# Patient Record
Sex: FEMALE | Race: WHITE | NOT HISPANIC OR LATINO | Employment: PART TIME | URBAN - METROPOLITAN AREA
[De-identification: names, ages, dates, MRNs, and addresses within clinical notes are randomized per-mention and may not be internally consistent; named-entity substitution may affect disease eponyms.]

---

## 2017-11-15 ENCOUNTER — CONVERSION ENCOUNTER (OUTPATIENT)
Dept: MAMMOGRAPHY | Facility: HOSPITAL | Age: 52
End: 2017-11-15

## 2018-08-28 ENCOUNTER — OFFICE VISIT CONVERTED (OUTPATIENT)
Dept: CARDIOLOGY | Facility: CLINIC | Age: 53
End: 2018-08-28
Attending: SPECIALIST

## 2018-10-26 ENCOUNTER — CONVERSION ENCOUNTER (OUTPATIENT)
Dept: CARDIOLOGY | Facility: CLINIC | Age: 53
End: 2018-10-26
Attending: SPECIALIST

## 2021-05-16 VITALS
DIASTOLIC BLOOD PRESSURE: 82 MMHG | WEIGHT: 145 LBS | BODY MASS INDEX: 24.16 KG/M2 | SYSTOLIC BLOOD PRESSURE: 126 MMHG | HEIGHT: 65 IN | HEART RATE: 68 BPM

## 2022-06-26 ENCOUNTER — APPOINTMENT (OUTPATIENT)
Dept: CT IMAGING | Facility: HOSPITAL | Age: 57
End: 2022-06-26

## 2022-06-26 ENCOUNTER — HOSPITAL ENCOUNTER (EMERGENCY)
Facility: HOSPITAL | Age: 57
Discharge: HOME | End: 2022-06-26
Attending: EMERGENCY MEDICINE | Admitting: EMERGENCY MEDICINE

## 2022-06-26 ENCOUNTER — APPOINTMENT (OUTPATIENT)
Dept: GENERAL RADIOLOGY | Facility: HOSPITAL | Age: 57
End: 2022-06-26

## 2022-06-26 VITALS
RESPIRATION RATE: 18 BRPM | OXYGEN SATURATION: 93 % | DIASTOLIC BLOOD PRESSURE: 72 MMHG | TEMPERATURE: 98.2 F | HEART RATE: 69 BPM | SYSTOLIC BLOOD PRESSURE: 115 MMHG | HEIGHT: 65 IN | WEIGHT: 150 LBS | BODY MASS INDEX: 24.99 KG/M2

## 2022-06-26 DIAGNOSIS — S51.802A AVULSION OF SKIN OF LEFT FOREARM, INITIAL ENCOUNTER: ICD-10-CM
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
## 2022-06-26 DIAGNOSIS — S52.531A CLOSED COLLES' FRACTURE OF RIGHT RADIUS, INITIAL ENCOUNTER: ICD-10-CM

## 2022-06-26 PROCEDURE — 25010000002 KETOROLAC TROMETHAMINE PER 15 MG: Performed by: EMERGENCY MEDICINE

## 2022-06-26 PROCEDURE — 73110 X-RAY EXAM OF WRIST: CPT

## 2022-06-26 PROCEDURE — 99284 EMERGENCY DEPT VISIT MOD MDM: CPT

## 2022-06-26 PROCEDURE — 96372 THER/PROPH/DIAG INJ SC/IM: CPT

## 2022-06-26 PROCEDURE — 70450 CT HEAD/BRAIN W/O DYE: CPT

## 2022-06-26 RX ORDER — HYDROCODONE BITARTRATE AND ACETAMINOPHEN 7.5; 325 MG/1; MG/1
1 TABLET ORAL EVERY 4 HOURS PRN
Qty: 16 TABLET | Refills: 0 | Status: SHIPPED | OUTPATIENT
Start: 2022-06-26 | End: 2022-06-30 | Stop reason: HOSPADM

## 2022-06-26 RX ORDER — NAPROXEN 500 MG/1
500 TABLET ORAL 2 TIMES DAILY WITH MEALS
Qty: 14 TABLET | Refills: 0 | Status: SHIPPED | OUTPATIENT
Start: 2022-06-26 | End: 2022-09-12

## 2022-06-26 RX ORDER — KETOROLAC TROMETHAMINE 30 MG/ML
30 INJECTION, SOLUTION INTRAMUSCULAR; INTRAVENOUS ONCE
Status: COMPLETED | OUTPATIENT
Start: 2022-06-26 | End: 2022-06-26

## 2022-06-26 RX ORDER — HYDROCODONE BITARTRATE AND ACETAMINOPHEN 7.5; 325 MG/1; MG/1
1 TABLET ORAL ONCE
Status: COMPLETED | OUTPATIENT
Start: 2022-06-26 | End: 2022-06-26

## 2022-06-26 RX ADMIN — HYDROCODONE BITARTRATE AND ACETAMINOPHEN 1 TABLET: 7.5; 325 TABLET ORAL at 01:55

## 2022-06-26 RX ADMIN — KETOROLAC TROMETHAMINE 30 MG: 30 INJECTION, SOLUTION INTRAMUSCULAR; INTRAVENOUS at 04:25

## 2022-06-27 ENCOUNTER — PREP FOR SURGERY (OUTPATIENT)
Dept: OTHER | Facility: HOSPITAL | Age: 57
End: 2022-06-27

## 2022-06-27 ENCOUNTER — OFFICE VISIT (OUTPATIENT)
Dept: ORTHOPEDIC SURGERY | Facility: CLINIC | Age: 57
End: 2022-06-27

## 2022-06-27 ENCOUNTER — TELEPHONE (OUTPATIENT)
Dept: ORTHOPEDIC SURGERY | Facility: CLINIC | Age: 57
End: 2022-06-27

## 2022-06-27 VITALS — HEART RATE: 81 BPM | OXYGEN SATURATION: 96 % | HEIGHT: 65 IN | WEIGHT: 153 LBS | BODY MASS INDEX: 25.49 KG/M2

## 2022-06-27 DIAGNOSIS — S52.501A CLOSED FRACTURE OF DISTAL END OF RIGHT RADIUS, UNSPECIFIED FRACTURE MORPHOLOGY, INITIAL ENCOUNTER: Primary | ICD-10-CM

## 2022-06-27 PROCEDURE — 99203 OFFICE O/P NEW LOW 30 MIN: CPT | Performed by: STUDENT IN AN ORGANIZED HEALTH CARE EDUCATION/TRAINING PROGRAM

## 2022-06-27 RX ORDER — CEFAZOLIN SODIUM 2 G/100ML
2 INJECTION, SOLUTION INTRAVENOUS ONCE
Status: CANCELLED | OUTPATIENT
Start: 2022-06-27 | End: 2022-06-27

## 2022-06-27 RX ORDER — CEFAZOLIN SODIUM IN 0.9 % NACL 3 G/100 ML
3 INTRAVENOUS SOLUTION, PIGGYBACK (ML) INTRAVENOUS ONCE
Status: CANCELLED | OUTPATIENT
Start: 2022-06-27 | End: 2022-06-27

## 2022-06-27 NOTE — PROGRESS NOTES
"Chief Complaint  Pain and Initial Evaluation of the Right Wrist    Subjective          Ailin Dexter presents to [facility] for   History of Present Illness    Patient presents today for evaluation of right wrist fracture. Patient reports she fell Saturday night when she slipped and fell over her dogs toys. She landed on right outstretched extremity. She was seen with xrays revealing a displaced distal radius fracture. She was placed into a cast. She is right hand dominant.  No Known Allergies     Social History     Socioeconomic History   • Marital status:    Tobacco Use   • Smoking status: Current Every Day Smoker   • Smokeless tobacco: Never Used        I reviewed the patient's chief complaint, history of present illness, review of systems, past medical history, surgical history, family history, social history, medications, and allergy list.     REVIEW OF SYSTEMS    Constitutional: Denies fevers, chills, weight loss  Cardiovascular: Denies chest pain, shortness of breath  Skin: Denies rashes, acute skin changes  Neurologic: Denies headache, loss of consciousness  MSK: right wrist      Objective   Vital Signs:   Pulse 81   Ht 165.1 cm (65\")   Wt 69.4 kg (153 lb)   SpO2 96%   BMI 25.46 kg/m²     Body mass index is 25.46 kg/m².    Physical Exam    General: Alert. No acute distress.    able to move the fingers,sensation intact, good capillary refill, sugar-tong splint, clean and dry, neuro intact, minimal swelling    Procedures    Imaging Results (Most Recent)     None                   Assessment and Plan        XR Wrist 3+ View Right    Result Date: 6/26/2022  Narrative: PROCEDURE: XR WRIST 3+ VW RIGHT  COMPARISON: None.  INDICATIONS: FALL. RIGHT WRIST PAIN.  FINDINGS:  3 views of the right wrist were obtained.  There is an acute Colles' fracture of the right radius.  A comminuted impacted possibly intra-articular closed fracture of the distal right radius is noted with " dorsal angulation of the distal fracture fragment, which is roughly estimated at 28 degrees.  There is approximately 8 mm of dorsal displacement of the large distal right radial fracture fragment.  The radiocarpal articulation and the distal radial-ulnar junction are intact.  There are mild degenerative changes.  External artifact obscures detail.  No definite acute fracture of the right ulnar styloid process is appreciated on the views provided.  No other acute fractures are seen.  Acute soft tissue contusion is centered about the right wrist.      Impression:   An acute fracture of the distal right radius is present, as discussed.  No dislocation.      COMMENT:  Part of this note is an electronic transcription of spoken language to printed text. The electronic translation/transcription may permit erroneous, or at times, nonsensical (or even sensical) words or phrases to be inadvertently transcribed or omitted; this  has reviewed the note for such errors (as well as additional errors); however, some may still exist.  CASSANDRA PORTILLO JR, MD       Electronically Signed and Approved By: CASSANDRA PORTILLO JR, MD on 6/26/2022 at 3:21              CT Head Without Contrast    Result Date: 6/26/2022  Narrative: PROCEDURE: CT HEAD WO CONTRAST  COMPARISON: None.  INDICATIONS: HEADACHE AFTER FALL, TODAY.  PROTOCOL:   Standard imaging protocol performed    RADIATION:   Total DLP: 1,081.2 mGy*cm   MA and/or KV were/was adjusted to minimize radiation dose.    TECHNIQUE: After obtaining the patient's consent, 138 CT images were obtained without non-ionic intravenous contrast material.  DISCUSSION:   A routine nonenhanced head CT was performed.  No acute brain abnormality is identified.  No acute intracranial hemorrhage.  No acute infarct is seen.  No acute skull fracture.  No midline shift or acute intracranial mass effect is seen.  The ventricular size and configuration are within normal limits.  Degenerative changes  involve the temporomandibular joints, greater on the right.  There are few opacities and inferior left and probably right mastoid air cells.  These findings are age-indeterminate.  They may represent chronic congestive and/or inflammatory change.  No acute fractures are seen in these regions.  No air-fluid interfaces are seen within the imaged paranasal sinuses.  There may be minimal mucosal thickening present.  There is very slight chronic rightward nasal septal deviation.  Grossly, no acute orbital findings are identified by routine nonenhanced head CT examination.      Impression:  No acute brain abnormality is seen. Specifically, no acute intracranial hemorrhage, no acute infarct, no intracranial mass lesion, and no acute intracranial mass effect are appreciated.   COMMENT:  Part of this note is an electronic transcription of spoken language to printed text. The electronic translation/transcription may permit erroneous, or at times, nonsensical (or even sensical) words or phrases to be inadvertently transcribed or omitted; this  has reviewed the note for such errors (as well as additional errors); however, some may still exist.  CASSANDRA PORTILLO JR, MD       Electronically Signed and Approved By: CASSANDRA PORTILLO JR, MD on 6/26/2022 at 3:50                 Diagnoses and all orders for this visit:    1. Closed fracture of distal end of right radius, unspecified fracture morphology, initial encounter (Primary)      We discussed surgical management.  We specifically discussed open reduction internal fixation of the right distal radius fracture.  We discussed risk, benefits, indications, and alternatives to surgical management.  She elected to proceed.  Consent was obtained.  Maintain splint and nonweightbearing status prior to surgery.  Ice and elevate to help with swelling.      Discussed surgery. and Risks/benefits discussed with patient including, but not limited to: infection, bleeding, neurovascular  damage, malunion, nonunion, aesthetic deformity, need for further surgery, and death.    Scribed for Erasto Schroeder MD by Linette Hendricks  06/27/2022   14:29 EDT         Follow Up   Return in about 2 weeks (around 7/11/2022).  Patient was given instructions and counseling regarding her condition or for health maintenance advice. Please see specific information pulled into the AVS if appropriate.   We discussed surgical risks including bleeding, infection, damage to nerves, blood vessels, persistent pain, stiffness, anesthesia risk, and DVT/PE. Patient understood and elected to proceed with surgical management.   We recommended she benefit from surgical intervention of this wrist fracture. She expressed understanding of the risks and benefits. She wished to proceed with surgical intervention. Advised to elevate, ice and move the fingers while she waits on surgery.      I have personally performed the services described in this document as scribed by the above individual and it is both accurate and complete.     Erasto Schroeder MD  06/27/22  14:33 EDT

## 2022-06-27 NOTE — TELEPHONE ENCOUNTER
Closed Colles' fracture of right radius, initial encounter and Avulsion of skin of left forearm, initial encounter- NOTES: 06.26.22- IMAGING; XRAY OF RIGHT WRIST 06.26.22 AT [facility].

## 2022-06-28 RX ORDER — PERPHENAZINE/AMITRIPTYLINE HCL 4MG-10MG
1 TABLET ORAL DAILY
COMMUNITY

## 2022-06-28 RX ORDER — MULTIVIT WITH MINERALS/LUTEIN
250 TABLET ORAL DAILY
COMMUNITY

## 2022-06-28 RX ORDER — BIOTIN 2500 MCG
CAPSULE ORAL
COMMUNITY

## 2022-06-28 RX ORDER — ECHINACEA 400 MG
1 CAPSULE ORAL DAILY
COMMUNITY

## 2022-06-30 ENCOUNTER — ANESTHESIA EVENT (OUTPATIENT)
Dept: PERIOP | Facility: HOSPITAL | Age: 57
End: 2022-06-30

## 2022-06-30 ENCOUNTER — APPOINTMENT (OUTPATIENT)
Dept: GENERAL RADIOLOGY | Facility: HOSPITAL | Age: 57
End: 2022-06-30

## 2022-06-30 ENCOUNTER — HOSPITAL ENCOUNTER (OUTPATIENT)
Facility: HOSPITAL | Age: 57
Setting detail: HOSPITAL OUTPATIENT SURGERY
Discharge: HOME | End: 2022-06-30
Attending: STUDENT IN AN ORGANIZED HEALTH CARE EDUCATION/TRAINING PROGRAM | Admitting: STUDENT IN AN ORGANIZED HEALTH CARE EDUCATION/TRAINING PROGRAM

## 2022-06-30 ENCOUNTER — ANESTHESIA (OUTPATIENT)
Dept: PERIOP | Facility: HOSPITAL | Age: 57
End: 2022-06-30

## 2022-06-30 VITALS
WEIGHT: 153.88 LBS | RESPIRATION RATE: 16 BRPM | HEIGHT: 65 IN | TEMPERATURE: 97.7 F | BODY MASS INDEX: 25.64 KG/M2 | OXYGEN SATURATION: 93 % | DIASTOLIC BLOOD PRESSURE: 64 MMHG | HEART RATE: 76 BPM | SYSTOLIC BLOOD PRESSURE: 116 MMHG

## 2022-06-30 DIAGNOSIS — S52.501A CLOSED FRACTURE OF DISTAL END OF RIGHT RADIUS, UNSPECIFIED FRACTURE MORPHOLOGY, INITIAL ENCOUNTER: Primary | ICD-10-CM

## 2022-06-30 LAB — QT INTERVAL: 415 MS

## 2022-06-30 PROCEDURE — 25010000002 PROPOFOL 10 MG/ML EMULSION: Performed by: NURSE ANESTHETIST, CERTIFIED REGISTERED

## 2022-06-30 PROCEDURE — C1713 ANCHOR/SCREW BN/BN,TIS/BN: HCPCS | Performed by: STUDENT IN AN ORGANIZED HEALTH CARE EDUCATION/TRAINING PROGRAM

## 2022-06-30 PROCEDURE — 25010000002 MIDAZOLAM PER 1 MG: Performed by: ANESTHESIOLOGY

## 2022-06-30 PROCEDURE — 25010000002 CEFAZOLIN IN DEXTROSE 2-4 GM/100ML-% SOLUTION: Performed by: STUDENT IN AN ORGANIZED HEALTH CARE EDUCATION/TRAINING PROGRAM

## 2022-06-30 PROCEDURE — 25010000002 ONDANSETRON PER 1 MG: Performed by: NURSE ANESTHETIST, CERTIFIED REGISTERED

## 2022-06-30 PROCEDURE — 25010000002 DEXAMETHASONE PER 1 MG: Performed by: NURSE ANESTHETIST, CERTIFIED REGISTERED

## 2022-06-30 PROCEDURE — 93005 ELECTROCARDIOGRAM TRACING: CPT | Performed by: STUDENT IN AN ORGANIZED HEALTH CARE EDUCATION/TRAINING PROGRAM

## 2022-06-30 PROCEDURE — 25609 OPTX DST RD XART FX/EP SEP3+: CPT | Performed by: STUDENT IN AN ORGANIZED HEALTH CARE EDUCATION/TRAINING PROGRAM

## 2022-06-30 PROCEDURE — 76942 ECHO GUIDE FOR BIOPSY: CPT | Performed by: STUDENT IN AN ORGANIZED HEALTH CARE EDUCATION/TRAINING PROGRAM

## 2022-06-30 PROCEDURE — 73100 X-RAY EXAM OF WRIST: CPT

## 2022-06-30 PROCEDURE — 25609 OPTX DST RD XART FX/EP SEP3+: CPT | Performed by: PHYSICIAN ASSISTANT

## 2022-06-30 PROCEDURE — 76000 FLUOROSCOPY <1 HR PHYS/QHP: CPT

## 2022-06-30 PROCEDURE — 25010000002 FENTANYL CITRATE (PF) 50 MCG/ML SOLUTION: Performed by: NURSE ANESTHETIST, CERTIFIED REGISTERED

## 2022-06-30 DEVICE — LOCKING SCREW, FULLY THREADED,T8
Type: IMPLANTABLE DEVICE | Site: WRIST | Status: FUNCTIONAL
Brand: VARIAX

## 2022-06-30 DEVICE — BONE SCREW, FULLY THREADED, T8
Type: IMPLANTABLE DEVICE | Site: WRIST | Status: FUNCTIONAL
Brand: VARIAX

## 2022-06-30 DEVICE — VOLAR DR PLATE INTERM. RIGHT SHORT
Type: IMPLANTABLE DEVICE | Site: WRIST | Status: FUNCTIONAL
Brand: VARIAX

## 2022-06-30 RX ORDER — OXYCODONE HYDROCHLORIDE 5 MG/1
5 TABLET ORAL
Status: DISCONTINUED | OUTPATIENT
Start: 2022-06-30 | End: 2022-06-30 | Stop reason: HOSPADM

## 2022-06-30 RX ORDER — BUPIVACAINE HYDROCHLORIDE AND EPINEPHRINE 5; 5 MG/ML; UG/ML
INJECTION, SOLUTION EPIDURAL; INTRACAUDAL; PERINEURAL
Status: COMPLETED | OUTPATIENT
Start: 2022-06-30 | End: 2022-06-30

## 2022-06-30 RX ORDER — LIDOCAINE HYDROCHLORIDE 20 MG/ML
INJECTION, SOLUTION EPIDURAL; INFILTRATION; INTRACAUDAL; PERINEURAL AS NEEDED
Status: DISCONTINUED | OUTPATIENT
Start: 2022-06-30 | End: 2022-06-30 | Stop reason: SURG

## 2022-06-30 RX ORDER — MAGNESIUM HYDROXIDE 1200 MG/15ML
LIQUID ORAL AS NEEDED
Status: DISCONTINUED | OUTPATIENT
Start: 2022-06-30 | End: 2022-06-30 | Stop reason: HOSPADM

## 2022-06-30 RX ORDER — DOCUSATE SODIUM 100 MG/1
100 CAPSULE, LIQUID FILLED ORAL 2 TIMES DAILY PRN
Qty: 20 CAPSULE | Refills: 0 | Status: SHIPPED | OUTPATIENT
Start: 2022-06-30 | End: 2022-09-12

## 2022-06-30 RX ORDER — CEFAZOLIN SODIUM 2 G/100ML
2 INJECTION, SOLUTION INTRAVENOUS ONCE
Status: COMPLETED | OUTPATIENT
Start: 2022-06-30 | End: 2022-06-30

## 2022-06-30 RX ORDER — MIDAZOLAM HYDROCHLORIDE 1 MG/ML
2 INJECTION INTRAMUSCULAR; INTRAVENOUS ONCE
Status: COMPLETED | OUTPATIENT
Start: 2022-06-30 | End: 2022-06-30

## 2022-06-30 RX ORDER — PROMETHAZINE HYDROCHLORIDE 12.5 MG/1
25 TABLET ORAL ONCE AS NEEDED
Status: DISCONTINUED | OUTPATIENT
Start: 2022-06-30 | End: 2022-06-30 | Stop reason: HOSPADM

## 2022-06-30 RX ORDER — ACETAMINOPHEN 500 MG
1000 TABLET ORAL ONCE
Status: COMPLETED | OUTPATIENT
Start: 2022-06-30 | End: 2022-06-30

## 2022-06-30 RX ORDER — ONDANSETRON 2 MG/ML
4 INJECTION INTRAMUSCULAR; INTRAVENOUS ONCE AS NEEDED
Status: DISCONTINUED | OUTPATIENT
Start: 2022-06-30 | End: 2022-06-30 | Stop reason: HOSPADM

## 2022-06-30 RX ORDER — EPHEDRINE SULFATE 50 MG/ML
INJECTION, SOLUTION INTRAVENOUS AS NEEDED
Status: DISCONTINUED | OUTPATIENT
Start: 2022-06-30 | End: 2022-06-30 | Stop reason: SURG

## 2022-06-30 RX ORDER — MIDAZOLAM HYDROCHLORIDE 1 MG/ML
INJECTION INTRAMUSCULAR; INTRAVENOUS
Status: COMPLETED
Start: 2022-06-30 | End: 2022-06-30

## 2022-06-30 RX ORDER — SODIUM CHLORIDE, SODIUM LACTATE, POTASSIUM CHLORIDE, CALCIUM CHLORIDE 600; 310; 30; 20 MG/100ML; MG/100ML; MG/100ML; MG/100ML
9 INJECTION, SOLUTION INTRAVENOUS CONTINUOUS PRN
Status: DISCONTINUED | OUTPATIENT
Start: 2022-06-30 | End: 2022-06-30 | Stop reason: HOSPADM

## 2022-06-30 RX ORDER — GLYCOPYRROLATE 0.2 MG/ML
0.2 INJECTION INTRAMUSCULAR; INTRAVENOUS
Status: COMPLETED | OUTPATIENT
Start: 2022-06-30 | End: 2022-06-30

## 2022-06-30 RX ORDER — HYDROCODONE BITARTRATE AND ACETAMINOPHEN 7.5; 325 MG/1; MG/1
1-2 TABLET ORAL EVERY 4 HOURS PRN
Qty: 40 TABLET | Refills: 0 | Status: SHIPPED | OUTPATIENT
Start: 2022-06-30 | End: 2022-09-12

## 2022-06-30 RX ORDER — CEFAZOLIN SODIUM IN 0.9 % NACL 3 G/100 ML
3 INTRAVENOUS SOLUTION, PIGGYBACK (ML) INTRAVENOUS ONCE
Status: DISCONTINUED | OUTPATIENT
Start: 2022-06-30 | End: 2022-06-30

## 2022-06-30 RX ORDER — DEXAMETHASONE SODIUM PHOSPHATE 4 MG/ML
INJECTION, SOLUTION INTRA-ARTICULAR; INTRALESIONAL; INTRAMUSCULAR; INTRAVENOUS; SOFT TISSUE AS NEEDED
Status: DISCONTINUED | OUTPATIENT
Start: 2022-06-30 | End: 2022-06-30 | Stop reason: SURG

## 2022-06-30 RX ORDER — PHENYLEPHRINE HCL IN 0.9% NACL 1 MG/10 ML
SYRINGE (ML) INTRAVENOUS AS NEEDED
Status: DISCONTINUED | OUTPATIENT
Start: 2022-06-30 | End: 2022-06-30 | Stop reason: SURG

## 2022-06-30 RX ORDER — MEPERIDINE HYDROCHLORIDE 25 MG/ML
12.5 INJECTION INTRAMUSCULAR; INTRAVENOUS; SUBCUTANEOUS
Status: DISCONTINUED | OUTPATIENT
Start: 2022-06-30 | End: 2022-06-30 | Stop reason: HOSPADM

## 2022-06-30 RX ORDER — FENTANYL CITRATE 50 UG/ML
INJECTION, SOLUTION INTRAMUSCULAR; INTRAVENOUS AS NEEDED
Status: DISCONTINUED | OUTPATIENT
Start: 2022-06-30 | End: 2022-06-30 | Stop reason: SURG

## 2022-06-30 RX ORDER — PROPOFOL 10 MG/ML
VIAL (ML) INTRAVENOUS AS NEEDED
Status: DISCONTINUED | OUTPATIENT
Start: 2022-06-30 | End: 2022-06-30 | Stop reason: SURG

## 2022-06-30 RX ORDER — ONDANSETRON 2 MG/ML
INJECTION INTRAMUSCULAR; INTRAVENOUS AS NEEDED
Status: DISCONTINUED | OUTPATIENT
Start: 2022-06-30 | End: 2022-06-30 | Stop reason: SURG

## 2022-06-30 RX ORDER — ONDANSETRON 4 MG/1
4 TABLET, FILM COATED ORAL EVERY 8 HOURS PRN
Qty: 30 TABLET | Refills: 0 | Status: SHIPPED | OUTPATIENT
Start: 2022-06-30 | End: 2022-09-12

## 2022-06-30 RX ORDER — PROMETHAZINE HYDROCHLORIDE 25 MG/1
25 SUPPOSITORY RECTAL ONCE AS NEEDED
Status: DISCONTINUED | OUTPATIENT
Start: 2022-06-30 | End: 2022-06-30 | Stop reason: HOSPADM

## 2022-06-30 RX ADMIN — SODIUM CHLORIDE, POTASSIUM CHLORIDE, SODIUM LACTATE AND CALCIUM CHLORIDE 9 ML/HR: 600; 310; 30; 20 INJECTION, SOLUTION INTRAVENOUS at 08:13

## 2022-06-30 RX ADMIN — LIDOCAINE HYDROCHLORIDE 80 MG: 20 INJECTION, SOLUTION EPIDURAL; INFILTRATION; INTRACAUDAL; PERINEURAL at 10:10

## 2022-06-30 RX ADMIN — EPHEDRINE SULFATE 10 MG: 50 INJECTION INTRAVENOUS at 10:29

## 2022-06-30 RX ADMIN — EPHEDRINE SULFATE 10 MG: 50 INJECTION INTRAVENOUS at 10:33

## 2022-06-30 RX ADMIN — CEFAZOLIN SODIUM 2 G: 2 INJECTION, SOLUTION INTRAVENOUS at 10:17

## 2022-06-30 RX ADMIN — Medication 100 MCG: at 10:21

## 2022-06-30 RX ADMIN — MIDAZOLAM HYDROCHLORIDE 2 MG: 1 INJECTION, SOLUTION INTRAMUSCULAR; INTRAVENOUS at 08:30

## 2022-06-30 RX ADMIN — MIDAZOLAM 2 MG: 1 INJECTION INTRAMUSCULAR; INTRAVENOUS at 09:07

## 2022-06-30 RX ADMIN — DEXAMETHASONE SODIUM PHOSPHATE 4 MG: 4 INJECTION, SOLUTION INTRA-ARTICULAR; INTRALESIONAL; INTRAMUSCULAR; INTRAVENOUS; SOFT TISSUE at 10:22

## 2022-06-30 RX ADMIN — BUPIVACAINE HYDROCHLORIDE AND EPINEPHRINE BITARTRATE 30 ML: 5; .005 INJECTION, SOLUTION EPIDURAL; INTRACAUDAL; PERINEURAL at 09:12

## 2022-06-30 RX ADMIN — GLYCOPYRROLATE 0.2 MG: 0.2 INJECTION INTRAMUSCULAR; INTRAVENOUS at 08:30

## 2022-06-30 RX ADMIN — FENTANYL CITRATE 25 MCG: 50 INJECTION, SOLUTION INTRAMUSCULAR; INTRAVENOUS at 10:10

## 2022-06-30 RX ADMIN — ONDANSETRON 4 MG: 2 INJECTION INTRAMUSCULAR; INTRAVENOUS at 10:37

## 2022-06-30 RX ADMIN — ACETAMINOPHEN 1000 MG: 500 TABLET ORAL at 08:13

## 2022-06-30 RX ADMIN — PROPOFOL 200 MG: 10 INJECTION, EMULSION INTRAVENOUS at 10:12

## 2022-06-30 NOTE — ANESTHESIA PREPROCEDURE EVALUATION
Anesthesia Evaluation     Patient summary reviewed and Nursing notes reviewed   no history of anesthetic complications:  NPO Solid Status: > 8 hours  NPO Liquid Status: > 2 hours           Airway   Mallampati: II  TM distance: >3 FB  Neck ROM: full  No difficulty expected  Dental      Pulmonary - negative pulmonary ROS and normal exam    breath sounds clear to auscultation  Cardiovascular - normal exam  Exercise tolerance: good (4-7 METS)    Rhythm: regular  Rate: normal    (+) hyperlipidemia,       Neuro/Psych- negative ROS  GI/Hepatic/Renal/Endo - negative ROS     Musculoskeletal (-) negative ROS    Abdominal    Substance History - negative use     OB/GYN negative ob/gyn ROS         Other - negative ROS                       Anesthesia Plan    ASA 2     general and general with block     (Patient understands anesthesia not responsible for dental damage.)  intravenous induction     Anesthetic plan, risks, benefits, and alternatives have been provided, discussed and informed consent has been obtained with: patient.  Use of blood products discussed with patient .   Plan discussed with CRNA.        CODE STATUS:

## 2022-06-30 NOTE — ANESTHESIA PROCEDURE NOTES
Peripheral Block    Pre-sedation assessment completed: 6/30/2022 9:07 AM    Patient reassessed immediately prior to procedure    Patient location during procedure: pre-op  Start time: 6/30/2022 9:07 AM  Stop time: 6/30/2022 9:12 AM  Reason for block: at surgeon's request and post-op pain management  Performed by  Anesthesiologist: Mikael Penaloza MD  Preanesthetic Checklist  Completed: patient identified, IV checked, site marked, risks and benefits discussed, surgical consent, monitors and equipment checked, pre-op evaluation and timeout performed  Prep:  Pt Position: supine  Sterile barriers:alcohol skin prep, partial drape, cap, washed/disinfected hands, gloves and mask  Prep: ChloraPrep  Patient monitoring: blood pressure monitoring, continuous pulse oximetry and EKG  Procedure    Sedation: yes  Performed under: local infiltration  Guidance:ultrasound guided and nerve stimulator    ULTRASOUND INTERPRETATION.  Using ultrasound guidance a 22 G gauge needle was placed in close proximity to the brachial plexus nerve, at which point, under ultrasound guidance anesthetic was injected in the area of the nerve and spread of the anesthesia was seen on ultrasound in close proximity thereto.  There were no abnormalities seen on ultrasound; a digital image was taken; and the patient tolerated the procedure with no complications. Images:still images obtained, printed/placed on chart    Laterality:right  Block Type:axillary  Injection Technique:single-shot  Needle Type:echogenic  Needle Gauge:22 G (2in)  Resistance on Injection: none    Medications Used: bupivacaine-EPINEPHrine PF (MARCAINE w/EPI) 0.5% -[id number] injection, 30 mL  Med administered at 6/30/2022 9:12 AM      Post Assessment  Injection Assessment: negative aspiration for heme, no paresthesia on injection and incremental injection  Patient Tolerance:comfortable throughout block  Complications:no  Additional Notes  The block or continuous infusion is requested by  the referring physician for management of postoperative pain, or pain related to a procedure. Ultrasound guidance (deemed medically necessary). Painless injection, pt was awake and conversant during the procedure without complications. Needle and surrounding structures visualized throughout procedure. No adverse reactions or complications seen during this period. Post-procedure image showed no signs of complication, and anatomy was consistent with an uncomplicated nerve blockade.

## 2022-06-30 NOTE — ANESTHESIA POSTPROCEDURE EVALUATION
Patient: Ailin Dexter    Procedure Summary     Date: 06/30/22 Room / Location: [facility] OR 03 / [facility] MAIN OR    Anesthesia Start: 1007 Anesthesia Stop: 1130    Procedure: OPEN REDUCTION INTERNAL FIXATION OF RIGHT RADIUS (Right Wrist) Diagnosis:       Closed fracture of distal end of right radius, unspecified fracture morphology, initial encounter      (Closed fracture of distal end of right radius, unspecified fracture morphology, initial encounter [S52.501A])    Surgeons: Erasto Schroeder MD Provider: Mikael Penaloza MD    Anesthesia Type: general, general with block ASA Status: 2          Anesthesia Type: general, general with block    Vitals  Vitals Value Taken Time   /73 06/30/22 1151   Temp 37.3 °C (99.2 °F) 06/30/22 1150   Pulse 75 06/30/22 1152   Resp 16 06/30/22 1150   SpO2 93 % 06/30/22 1152   Vitals shown include unvalidated device data.        Post Anesthesia Care and Evaluation    Patient location during evaluation: bedside  Patient participation: complete - patient participated  Level of consciousness: awake  Pain management: adequate    Airway patency: patent  Anesthetic complications: No anesthetic complications  PONV Status: none  Cardiovascular status: acceptable  Respiratory status: acceptable  Hydration status: acceptable    Comments: An Anesthesiologist personally participated in the most demanding procedures (including induction and emergence if applicable) in the anesthesia plan, monitored the course of anesthesia administration at frequent intervals and remained physically present and available for immediate diagnosis and treatment of emergencies.

## 2022-07-01 ENCOUNTER — TELEPHONE (OUTPATIENT)
Dept: ORTHOPEDIC SURGERY | Facility: CLINIC | Age: 57
End: 2022-07-01

## 2022-07-01 NOTE — TELEPHONE ENCOUNTER
Spoke with patient advised her to continue to hole Naproxen until instructed at follow up to resume as NSAIDs can cause slower healing of fractures. Patient understood.

## 2022-07-01 NOTE — TELEPHONE ENCOUNTER
Provider: KELLY HUNT MD  Caller: GEOFF TEE  Relationship to Patient: PATIENT     Phone Number:[phone]  Reason for Call: PRIOR TO SX 06/30/22 PATIENT WAS TOLD TO STOP TAKING NAPROXEN.  CAN SHE RESUME TAKING NAPROXEN?  PLEASE CALL PATIENT, ADVISED.  THANK  YOU

## 2022-07-13 ENCOUNTER — OFFICE VISIT (OUTPATIENT)
Dept: ORTHOPEDIC SURGERY | Facility: CLINIC | Age: 57
End: 2022-07-13

## 2022-07-13 VITALS — WEIGHT: 153 LBS | HEIGHT: 65 IN | BODY MASS INDEX: 25.49 KG/M2

## 2022-07-13 DIAGNOSIS — S52.501D CLOSED FRACTURE OF DISTAL END OF RIGHT RADIUS WITH ROUTINE HEALING, UNSPECIFIED FRACTURE MORPHOLOGY, SUBSEQUENT ENCOUNTER: Primary | ICD-10-CM

## 2022-07-13 PROCEDURE — 99024 POSTOP FOLLOW-UP VISIT: CPT | Performed by: PHYSICIAN ASSISTANT

## 2022-07-13 NOTE — PROGRESS NOTES
"Chief Complaint  Pain and Follow-up of the Right Wrist    Subjective          Ailin Dexter presents to [facility] for   History of Present Illness    Ailin presents today for follow-up of her right wrist.  Patient is 2 weeks postop open reduction internal fixation of her right distal radius fracture performed by Dr. Schroeder on 6/30/2022.  Today, patient states she is doing well.  She is remained in her splint since surgery.  She states she is taking her pain medication as needed.  She denies numbness or tingling.  She denies swelling.  She denies fever or chills.  Denies complications with her incision.    No Known Allergies     Social History     Socioeconomic History   • Marital status:    Tobacco Use   • Smoking status: Current Every Day Smoker     Packs/day: 0.50   • Smokeless tobacco: Never Used   • Tobacco comment: INSTRUCTED NO SMOKING 24 HR PRIOR TO SURGERY   Vaping Use   • Vaping Use: Every day   • Substances: Nicotine   • Devices: Pre-filled pod, INSTRUCTED NO VAPING 24 HR PRIOR TO SURGERY   Substance and Sexual Activity   • Alcohol use: Yes     Comment: OCC.   • Drug use: Never   • Sexual activity: Defer        I reviewed the patient's chief complaint, history of present illness, review of systems, past medical history, surgical history, family history, social history, medications, and allergy list.     REVIEW OF SYSTEMS    Constitutional: Denies fevers, chills, weight loss  Cardiovascular: Denies chest pain, shortness of breath  Skin: Denies rashes, acute skin changes  Neurologic: Denies headache, loss of consciousness  MSK: Right wrist pain      Objective   Vital Signs:   Ht 165.1 cm (65\")   Wt 69.4 kg (153 lb)   BMI 25.46 kg/m²     Body mass index is 25.46 kg/m².    Physical Exam    General: Alert. No acute distress.   Right upper extremity: Splint removed.  Sutures removed today without complications.  Incision is well-healing.  No active drainage or redness " noted.  No concerning signs for infection.  Active wrist and finger range of motion.  Stiffness associated with wrist flexion and extension.  No pain with radial ulnar deviation.  Forearm soft.  Stiffness with finger flexion.  Full finger extension.  Sensation intact in median, radial, and ulnar nerve distributions.  Palpable radial pulse.    Procedures    Imaging Results (Most Recent)     Procedure Component Value Units Date/Time    XR Wrist 2 View Right [[id number]] Resulted: 07/13/22 1220     Updated: 07/13/22 1223    Narrative:      Indications: Follow up right wrist fracture    Views: AP and lateral right wrist    Findings: Right distal radius fracture is seen.  Fracture is well aligned.    Fracture fixation with plate and screw construct in place and stable.  No   evidence of hardware complications or loosening noted.  DRUJ is well   aligned.  No additional fractures are seen.    Comparative Data: No comparative data available.                    Assessment and Plan    Diagnoses and all orders for this visit:    1. Closed fracture of distal end of right radius with routine healing, unspecified fracture morphology, subsequent encounter (Primary)  -     XR Wrist 2 View Right        Ailin presents today 2 weeks postop open reduction internal fixation of her right distal radius fracture performed by Dr. Schroeder on 6/30/2022.  X-rays reviewed with the patient today.  Incision is well-healing.  No active drainage or redness.  No concerning signs for infection. Incision site care was reviewed today. Patient instructed not to soak or submerge incision. Do not apply any lotions, creams, or ointments to the incision at this time.  We discussed concerning signs and symptoms regarding the incision site.  We discussed applying gauze over the incision while wearing the wrist brace as needed to avoid irritation.  Patient understood and agreed.  Patient was given a comfort form wrist brace today that she is instructed to wear  at all times over the next 3 weeks except for hygiene and range of motion exercises.  Wrist and finger range of motion exercises were demonstrated in the office today.  We did specifically discussed the importance of making a tight fist.  Patient understood and agreed.  Formal occupational therapy will be ordered at next visit if necessary.  Use ice and elevation as needed to help with inflammation.  We discussed the importance of weaning from narcotic medication.  Patient understood and agreed. Patient will follow up in 3 weeks for reevaluation.  We will obtain new x-rays of the right wrist at next visit.    Call or return if symptoms worsen or patient has any concerns.   Will obtain X-Rays of right wrist at next visit.         Follow Up   Return in about 3 weeks (around 8/3/2022).  Patient was given instructions and counseling regarding her condition or for health maintenance advice. Please see specific information pulled into the AVS if appropriate.     Pilar Shafer PA-C  07/13/22  12:26 EDT

## 2022-08-12 ENCOUNTER — OFFICE VISIT (OUTPATIENT)
Dept: ORTHOPEDIC SURGERY | Facility: CLINIC | Age: 57
End: 2022-08-12

## 2022-08-12 VITALS — BODY MASS INDEX: 25.16 KG/M2 | HEIGHT: 65 IN | HEART RATE: 104 BPM | WEIGHT: 151 LBS | OXYGEN SATURATION: 96 %

## 2022-08-12 DIAGNOSIS — S52.501D CLOSED FRACTURE OF DISTAL END OF RIGHT RADIUS WITH ROUTINE HEALING, UNSPECIFIED FRACTURE MORPHOLOGY, SUBSEQUENT ENCOUNTER: Primary | ICD-10-CM

## 2022-08-12 PROCEDURE — 99024 POSTOP FOLLOW-UP VISIT: CPT | Performed by: STUDENT IN AN ORGANIZED HEALTH CARE EDUCATION/TRAINING PROGRAM

## 2022-08-12 NOTE — PROGRESS NOTES
"Chief Complaint  Follow-up and Pain of the Right Wrist    Subjective          Ailin Dexter presents to [facility] for   History of Present Illness    Ailin returns today for follow-up of her right wrist.  She is approximately 6-week status post open reduction internal fixation of a right distal radius fracture.  She has been doing home exercises.  She feels improved pain and motion at this time.  She denies any new injuries.  She denies any redness or drainage from her incision.  She denies any numbness.    No Known Allergies     Social History     Socioeconomic History   • Marital status:    Tobacco Use   • Smoking status: Current Every Day Smoker     Packs/day: 0.50   • Smokeless tobacco: Never Used   • Tobacco comment: INSTRUCTED NO SMOKING 24 HR PRIOR TO SURGERY   Vaping Use   • Vaping Use: Every day   • Substances: Nicotine   • Devices: Pre-filled pod, INSTRUCTED NO VAPING 24 HR PRIOR TO SURGERY   Substance and Sexual Activity   • Alcohol use: Yes     Comment: OCC.   • Drug use: Never   • Sexual activity: Defer        I reviewed the patient's chief complaint, history of present illness, review of systems, past medical history, surgical history, family history, social history, medications, and allergy list.     REVIEW OF SYSTEMS    Constitutional: Denies fevers, chills, weight loss  Cardiovascular: Denies chest pain, shortness of breath  Skin: Denies rashes, acute skin changes  Neurologic: Denies headache, loss of consciousness  MSK: Right wrist pain      Objective   Vital Signs:   Pulse 104   Ht 165.1 cm (65\")   Wt 68.5 kg (151 lb)   SpO2 96%   BMI 25.13 kg/m²     Body mass index is 25.13 kg/m².    Physical Exam    General: Alert. No acute distress.   Right lower extremity: Well-healed volar incision over the distal radius.  No redness.  No swelling.  Nontender over the distal radius, distal ulna, or DRUJ.  Full active finger range of motion.  Able to make a tight fist.  " 30 degrees of wrist flexion and extension.  60 degrees of forearm pronation and supination.  No mechanical symptoms with motion.  DRUJ stable.  Sensation intact in the median, radial, ulnar nerve distributions.  Motor intact with AIN, PIN, ulnar function.  Palpable radial pulse.    Procedures    Imaging Results (Most Recent)     Procedure Component Value Units Date/Time    XR Wrist 2 View Right [[id number]] Resulted: 08/12/22 1135     Updated: 08/12/22 1136    Narrative:      Indications: Follow-up right distal radius fracture    Views: AP and lateral right wrist    Findings: Right distal radius and ulna styloid fracture is again seen.    Fracture alignment stable.  Ulnar styloid avulsion fracture is unchanged.    Volar plate and screw construct in place without evidence of hardware   complication.  Early callus forming at the fracture site.  DRUJ well   aligned.    Comparative Data: Comparative data found and reviewed today                     Assessment and Plan        XR Wrist 2 View Right    Result Date: 8/12/2022  Narrative: Indications: Follow-up right distal radius fracture Views: AP and lateral right wrist Findings: Right distal radius and ulna styloid fracture is again seen.  Fracture alignment stable.  Ulnar styloid avulsion fracture is unchanged.  Volar plate and screw construct in place without evidence of hardware complication.  Early callus forming at the fracture site.  DRUJ well aligned. Comparative Data: Comparative data found and reviewed today        Diagnoses and all orders for this visit:    1. Closed fracture of distal end of right radius with routine healing, unspecified fracture morphology, subsequent encounter (Primary)  -     XR Wrist 2 View Right        We reviewed the x-rays obtained today.  These are stable and show signs of early callus formation.  She is now approximately 6 weeks out.  She may use the brace as needed going forward.  We discussed a 5 to 10 pound lifting restriction as  able.  We discussed formal PT, but she declined.  She will work on finger, wrist, and forearm range of motion exercises as instructed today in the office.  Follow-up in 4 weeks for reevaluation.  We will obtain new x-rays of the right wrist when she returns.        Call or return if worsening symptoms.    Scribed for Erasto Schroeder MD by Gricelda Spears  08/12/2022   11:04 EDT         Follow Up   Return in about 4 weeks (around 9/9/2022).  Patient was given instructions and counseling regarding her condition or for health maintenance advice. Please see specific information pulled into the AVS if appropriate.       I have personally performed the services described in this document as scribed by the above individual and it is both accurate and complete.     Erasto Schroeder MD  08/12/22  11:44 EDT

## 2022-09-12 ENCOUNTER — OFFICE VISIT (OUTPATIENT)
Dept: ORTHOPEDIC SURGERY | Facility: CLINIC | Age: 57
End: 2022-09-12

## 2022-09-12 VITALS — BODY MASS INDEX: 25.33 KG/M2 | HEIGHT: 65 IN | WEIGHT: 152 LBS

## 2022-09-12 DIAGNOSIS — M25.531 RIGHT WRIST PAIN: ICD-10-CM

## 2022-09-12 DIAGNOSIS — S52.501D CLOSED FRACTURE OF DISTAL END OF RIGHT RADIUS WITH ROUTINE HEALING, UNSPECIFIED FRACTURE MORPHOLOGY, SUBSEQUENT ENCOUNTER: Primary | ICD-10-CM

## 2022-09-12 PROCEDURE — 99024 POSTOP FOLLOW-UP VISIT: CPT | Performed by: PHYSICIAN ASSISTANT

## 2022-09-12 NOTE — PROGRESS NOTES
"Chief Complaint  Follow-up of the Right Wrist    Subjective          Ailin Dexter presents to [facility] for   History of Present Illness    Ailin Dexter presents today for follow-up of her right wrist.  Patient is status post right distal radius ORIF on 6/30/2022.  Today, patient states she is doing well.  She reports no pain today.  She has been working on her home exercises.  She reports some light strengthening exercises without complications.  She denies swelling.  Denies new injuries.  Denies numbness or tingling.      No Known Allergies     Social History     Socioeconomic History   • Marital status:    Tobacco Use   • Smoking status: Current Every Day Smoker     Packs/day: 0.50   • Smokeless tobacco: Never Used   • Tobacco comment: INSTRUCTED NO SMOKING 24 HR PRIOR TO SURGERY   Vaping Use   • Vaping Use: Every day   • Substances: Nicotine   • Devices: Pre-filled pod, INSTRUCTED NO VAPING 24 HR PRIOR TO SURGERY   Substance and Sexual Activity   • Alcohol use: Yes     Comment: OCC.   • Drug use: Never   • Sexual activity: Defer        I reviewed the patient's chief complaint, history of present illness, review of systems, past medical history, surgical history, family history, social history, medications, and allergy list.     REVIEW OF SYSTEMS    Constitutional: Denies fevers, chills, weight loss  Cardiovascular: Denies chest pain, shortness of breath  Skin: Denies rashes, acute skin changes  Neurologic: Denies headache, loss of consciousness  MSK: Right wrist pain      Objective   Vital Signs:   Ht 165.1 cm (65\")   Wt 68.9 kg (152 lb)   BMI 25.29 kg/m²     Body mass index is 25.29 kg/m².    Physical Exam    General: Alert. No acute distress.   Right upper extremity: Well-healed incision.  Forearm soft.  Palpable radial pulse.  Demonstrates active wrist range of motion with associated stiffness.  Wrist flexion 45 degrees.  Wrist extension 30 degrees.  Full finger " flexion.  Full finger extension.  Able to make a tight fist.  Thumb opposition intact.  Palmar adduction of the thumb intact.  Sensation intact to median, radial, and ulnar nerve distributions.    Procedures    Imaging Results (Most Recent)     Procedure Component Value Units Date/Time    XR Wrist 2 View Right [[id number]] Resulted: 09/12/22 1602     Updated: 09/12/22 1604    Narrative:      Indications: Follow-up right wrist fracture    Views: AP and lateral right wrist    Findings: Right distal radius fracture is seen with an increase in callus   formation and stable alignment.  Fracture fixation remains in place.  No   complications or loosening are seen.  Separate ulnar styloid avulsion   fracture is seen and stable.  DRUJ is well aligned.  No additional   fractures noted.    Comparative Data: Comparative data found and reviewed today.                   Assessment and Plan    Diagnoses and all orders for this visit:    1. Closed fracture of distal end of right radius with routine healing, unspecified fracture morphology, subsequent encounter (Primary)    2. Right wrist pain  -     XR Wrist 2 View Right        Ailin Dexter presents today status post right distal radius fracture ORIF on 6/30/2022.  X-rays reviewed with the patient today.  Patient is instructed to continue with her home exercises.  We specifically discussed improving her wrist range of motion.  These exercises were demonstrated in the office today.  Patient understood and agreed.  Patient was offered formal occupational therapy but elected to continue with her home exercises at this time.  Patient will continue with 10 pound lifting restriction.  Work note written today for patient to return to work with a 10 pound lifting restriction.  Patient will follow up in 4 weeks for reevaluation.  We will obtain x-rays of the right wrist at next visit.        Call or return if symptoms worsen or patient has any concerns.   Will obtain X-Rays of right wrist  at next visit.         Follow Up   Return in about 4 weeks (around 10/10/2022).  Patient was given instructions and counseling regarding her condition or for health maintenance advice. Please see specific information pulled into the AVS if appropriate.     Pilar Shafer PA-C  09/12/22  16:05 EDT

## 2022-10-17 ENCOUNTER — OFFICE VISIT (OUTPATIENT)
Dept: ORTHOPEDIC SURGERY | Facility: CLINIC | Age: 57
End: 2022-10-17

## 2022-10-17 VITALS — HEIGHT: 65 IN | BODY MASS INDEX: 25.33 KG/M2 | WEIGHT: 152 LBS

## 2022-10-17 DIAGNOSIS — M25.531 RIGHT WRIST PAIN: ICD-10-CM

## 2022-10-17 DIAGNOSIS — S52.501D CLOSED FRACTURE OF DISTAL END OF RIGHT RADIUS WITH ROUTINE HEALING, UNSPECIFIED FRACTURE MORPHOLOGY, SUBSEQUENT ENCOUNTER: Primary | ICD-10-CM

## 2022-10-17 PROCEDURE — 99213 OFFICE O/P EST LOW 20 MIN: CPT | Performed by: PHYSICIAN ASSISTANT

## 2022-10-17 NOTE — PROGRESS NOTES
"Chief Complaint  Follow-up of the Right Wrist    Subjective          Ailin Dexter presents to [facility] for   History of Present Illness    Ailin Dexter presents today for a follow-up of her right wrist.  Patient is status post right distal radius fracture ORIF on 6/30/2022.  Today, she states she is doing great.  She has been doing her home exercises and noticed improvements in her range of motion.  She reports no pain to the wrist.  She has returned to doing all activities without complications.  Denies new injuries.  Denies numbness or tingling.      No Known Allergies     Social History     Socioeconomic History   • Marital status:    Tobacco Use   • Smoking status: Every Day     Packs/day: 0.50     Types: Cigarettes   • Smokeless tobacco: Never   • Tobacco comments:     INSTRUCTED NO SMOKING 24 HR PRIOR TO SURGERY   Vaping Use   • Vaping Use: Every day   • Substances: Nicotine   • Devices: Pre-filled pod, INSTRUCTED NO VAPING 24 HR PRIOR TO SURGERY   Substance and Sexual Activity   • Alcohol use: Yes     Comment: OCC.   • Drug use: Never   • Sexual activity: Defer        I reviewed the patient's chief complaint, history of present illness, review of systems, past medical history, surgical history, family history, social history, medications, and allergy list.     REVIEW OF SYSTEMS    Constitutional: Denies fevers, chills, weight loss  Cardiovascular: Denies chest pain, shortness of breath  Skin: Denies rashes, acute skin changes  Neurologic: Denies headache, loss of consciousness  MSK: Right wrist pain      Objective   Vital Signs:   Ht 165.1 cm (65\")   Wt 68.9 kg (152 lb)   BMI 25.29 kg/m²     Body mass index is 25.29 kg/m².    Physical Exam    General: Alert. No acute distress.   Right upper extremity: Well-healed incision.  Forearm soft and nontender.  Nontender to palpation of the wrist.  Active wrist range of motion.  Wrist flexion 60.  Wrist extension 40.  No pain " with ulnar or radial deviation.  Full finger flexion.  Full finger extension.  Thumb opposition intact.  Palmar abduction of thumb intact.  Sensation intact to median, radial, and ulnar nerve distributions.  Palpable radial pulse.    Procedures    Imaging Results (Most Recent)     Procedure Component Value Units Date/Time    XR Wrist 2 View Right [[id number]] Resulted: 10/17/22 1546     Updated: 10/17/22 1547    Narrative:      Indications: Follow-up right wrist    Views: AP and lateral right wrist    Findings: Right distal radius fracture is seen with stable alignment.    Fracture fixation with plate and screw construct remains in place compared   to previous films.  No hardware complications or loosening.  Separate   ulnar styloid avulsion fracture is stable.  DRUJ is well aligned.  No   additional fractures.    Comparative Data: Comparative data found and reviewed today.                   Assessment and Plan    Diagnoses and all orders for this visit:    1. Closed fracture of distal end of right radius with routine healing, unspecified fracture morphology, subsequent encounter (Primary)    2. Right wrist pain  -     XR Wrist 2 View Right        Ailin Dexter presents today for follow-up of her right distal radius ORIF performed on 6/30/2022.  X-rays reviewed with the patient today.  Incision is well-healed.  Patient is instructed to continue with her home exercises working on range of motion and strength.  Continue with activities as tolerated.  Patient will follow-up as needed.    Call or return if symptoms worsen or patient has any concerns.       Follow Up   Return if symptoms worsen or fail to improve.  Patient was given instructions and counseling regarding her condition or for health maintenance advice. Please see specific information pulled into the AVS if appropriate.     Pilar Shafer PA-C  10/17/22  15:48 EDT

## 2022-11-04 ENCOUNTER — HOSPITAL ENCOUNTER (EMERGENCY)
Facility: HOSPITAL | Age: 57
Discharge: HOME | End: 2022-11-05
Attending: EMERGENCY MEDICINE | Admitting: EMERGENCY MEDICINE

## 2022-11-04 ENCOUNTER — APPOINTMENT (OUTPATIENT)
Dept: GENERAL RADIOLOGY | Facility: HOSPITAL | Age: 57
End: 2022-11-04

## 2022-11-04 VITALS
WEIGHT: 152 LBS | SYSTOLIC BLOOD PRESSURE: 138 MMHG | RESPIRATION RATE: 18 BRPM | TEMPERATURE: 98.2 F | OXYGEN SATURATION: 92 % | DIASTOLIC BLOOD PRESSURE: 82 MMHG | HEIGHT: 65 IN | BODY MASS INDEX: 25.33 KG/M2 | HEART RATE: 103 BPM

## 2022-11-04 DIAGNOSIS — R07.89 CHEST DISCOMFORT: Primary | ICD-10-CM

## 2022-11-04 DIAGNOSIS — J20.9 ACUTE BRONCHITIS, UNSPECIFIED ORGANISM: ICD-10-CM

## 2022-11-04 DIAGNOSIS — J02.9 PHARYNGITIS, UNSPECIFIED ETIOLOGY: ICD-10-CM

## 2022-11-04 DIAGNOSIS — J44.9 CHRONIC OBSTRUCTIVE PULMONARY DISEASE, UNSPECIFIED COPD TYPE: ICD-10-CM

## 2022-11-04 LAB
ALBUMIN SERPL-MCNC: 4.8 G/DL (ref 3.5–5.2)
ALBUMIN/GLOB SERPL: 1.3 G/DL
ALP SERPL-CCNC: 90 U/L (ref 39–117)
ALT SERPL W P-5'-P-CCNC: 79 U/L (ref 1–33)
ANION GAP SERPL CALCULATED.3IONS-SCNC: 17.5 MMOL/L (ref 5–15)
AST SERPL-CCNC: 81 U/L (ref 1–32)
BASOPHILS # BLD AUTO: 0.07 10*3/MM3 (ref 0–0.2)
BASOPHILS NFR BLD AUTO: 0.6 % (ref 0–1.5)
BILIRUB SERPL-MCNC: 0.5 MG/DL (ref 0–1.2)
BUN SERPL-MCNC: 8 MG/DL (ref 6–20)
BUN/CREAT SERPL: 11.6 (ref 7–25)
CALCIUM SPEC-SCNC: 10.7 MG/DL (ref 8.6–10.5)
CHLORIDE SERPL-SCNC: 100 MMOL/L (ref 98–107)
CO2 SERPL-SCNC: 22.5 MMOL/L (ref 22–29)
CREAT SERPL-MCNC: 0.69 MG/DL (ref 0.57–1)
DEPRECATED RDW RBC AUTO: 45.7 FL (ref 37–54)
EGFRCR SERPLBLD CKD-EPI 2021: 100.7 ML/MIN/1.73
EOSINOPHIL # BLD AUTO: 0.08 10*3/MM3 (ref 0–0.4)
EOSINOPHIL NFR BLD AUTO: 0.7 % (ref 0.3–6.2)
ERYTHROCYTE [DISTWIDTH] IN BLOOD BY AUTOMATED COUNT: 12.8 % (ref 12.3–15.4)
FLUAV AG NPH QL: NEGATIVE
FLUBV AG NPH QL IA: NEGATIVE
GLOBULIN UR ELPH-MCNC: 3.6 GM/DL
GLUCOSE SERPL-MCNC: 97 MG/DL (ref 65–99)
HCT VFR BLD AUTO: 48 % (ref 34–46.6)
HGB BLD-MCNC: 16.3 G/DL (ref 12–15.9)
HOLD SPECIMEN: NORMAL
IMM GRANULOCYTES # BLD AUTO: 0.06 10*3/MM3 (ref 0–0.05)
IMM GRANULOCYTES NFR BLD AUTO: 0.5 % (ref 0–0.5)
LIPASE SERPL-CCNC: 31 U/L (ref 13–60)
LYMPHOCYTES # BLD AUTO: 1.76 10*3/MM3 (ref 0.7–3.1)
LYMPHOCYTES NFR BLD AUTO: 16.1 % (ref 19.6–45.3)
MAGNESIUM SERPL-MCNC: 2 MG/DL (ref 1.6–2.6)
MCH RBC QN AUTO: 33 PG (ref 26.6–33)
MCHC RBC AUTO-ENTMCNC: 34 G/DL (ref 31.5–35.7)
MCV RBC AUTO: 97.2 FL (ref 79–97)
MONOCYTES # BLD AUTO: 1 10*3/MM3 (ref 0.1–0.9)
MONOCYTES NFR BLD AUTO: 9.2 % (ref 5–12)
NEUTROPHILS NFR BLD AUTO: 7.94 10*3/MM3 (ref 1.7–7)
NEUTROPHILS NFR BLD AUTO: 72.9 % (ref 42.7–76)
NRBC BLD AUTO-RTO: 0 /100 WBC (ref 0–0.2)
NT-PROBNP SERPL-MCNC: 68.2 PG/ML (ref 0–900)
PLATELET # BLD AUTO: 249 10*3/MM3 (ref 140–450)
PMV BLD AUTO: 10 FL (ref 6–12)
POTASSIUM SERPL-SCNC: 4.1 MMOL/L (ref 3.5–5.2)
PROT SERPL-MCNC: 8.4 G/DL (ref 6–8.5)
RBC # BLD AUTO: 4.94 10*6/MM3 (ref 3.77–5.28)
S PYO AG THROAT QL: NEGATIVE
SODIUM SERPL-SCNC: 140 MMOL/L (ref 136–145)
TROPONIN I SERPL-MCNC: 0 NG/ML (ref 0–0.08)
TROPONIN I SERPL-MCNC: 0 NG/ML (ref 0–0.08)
WBC NRBC COR # BLD: 10.91 10*3/MM3 (ref 3.4–10.8)
WHOLE BLOOD HOLD COAG: NORMAL
WHOLE BLOOD HOLD SPECIMEN: NORMAL

## 2022-11-04 PROCEDURE — 96374 THER/PROPH/DIAG INJ IV PUSH: CPT

## 2022-11-04 PROCEDURE — 83735 ASSAY OF MAGNESIUM: CPT

## 2022-11-04 PROCEDURE — 94640 AIRWAY INHALATION TREATMENT: CPT

## 2022-11-04 PROCEDURE — 80053 COMPREHEN METABOLIC PANEL: CPT

## 2022-11-04 PROCEDURE — 94799 UNLISTED PULMONARY SVC/PX: CPT

## 2022-11-04 PROCEDURE — C9803 HOPD COVID-19 SPEC COLLECT: HCPCS | Performed by: EMERGENCY MEDICINE

## 2022-11-04 PROCEDURE — 71045 X-RAY EXAM CHEST 1 VIEW: CPT

## 2022-11-04 PROCEDURE — 93010 ELECTROCARDIOGRAM REPORT: CPT | Performed by: INTERNAL MEDICINE

## 2022-11-04 PROCEDURE — 87804 INFLUENZA ASSAY W/OPTIC: CPT | Performed by: EMERGENCY MEDICINE

## 2022-11-04 PROCEDURE — 84484 ASSAY OF TROPONIN QUANT: CPT

## 2022-11-04 PROCEDURE — 83690 ASSAY OF LIPASE: CPT

## 2022-11-04 PROCEDURE — 87081 CULTURE SCREEN ONLY: CPT | Performed by: EMERGENCY MEDICINE

## 2022-11-04 PROCEDURE — 25010000002 ONDANSETRON PER 1 MG: Performed by: EMERGENCY MEDICINE

## 2022-11-04 PROCEDURE — 87880 STREP A ASSAY W/OPTIC: CPT | Performed by: EMERGENCY MEDICINE

## 2022-11-04 PROCEDURE — 99284 EMERGENCY DEPT VISIT MOD MDM: CPT

## 2022-11-04 PROCEDURE — 93005 ELECTROCARDIOGRAM TRACING: CPT

## 2022-11-04 PROCEDURE — 36415 COLL VENOUS BLD VENIPUNCTURE: CPT

## 2022-11-04 PROCEDURE — 85025 COMPLETE CBC W/AUTO DIFF WBC: CPT

## 2022-11-04 PROCEDURE — 96375 TX/PRO/DX INJ NEW DRUG ADDON: CPT

## 2022-11-04 PROCEDURE — 83880 ASSAY OF NATRIURETIC PEPTIDE: CPT

## 2022-11-04 PROCEDURE — U0004 COV-19 TEST NON-CDC HGH THRU: HCPCS | Performed by: EMERGENCY MEDICINE

## 2022-11-04 RX ORDER — ONDANSETRON 2 MG/ML
4 INJECTION INTRAMUSCULAR; INTRAVENOUS ONCE
Status: COMPLETED | OUTPATIENT
Start: 2022-11-04 | End: 2022-11-04

## 2022-11-04 RX ORDER — ASPIRIN 81 MG/1
324 TABLET, CHEWABLE ORAL ONCE
Status: COMPLETED | OUTPATIENT
Start: 2022-11-04 | End: 2022-11-04

## 2022-11-04 RX ORDER — PANTOPRAZOLE SODIUM 40 MG/10ML
40 INJECTION, POWDER, LYOPHILIZED, FOR SOLUTION INTRAVENOUS ONCE
Status: COMPLETED | OUTPATIENT
Start: 2022-11-04 | End: 2022-11-04

## 2022-11-04 RX ORDER — SODIUM CHLORIDE 0.9 % (FLUSH) 0.9 %
10 SYRINGE (ML) INJECTION AS NEEDED
Status: DISCONTINUED | OUTPATIENT
Start: 2022-11-04 | End: 2022-11-05 | Stop reason: HOSPADM

## 2022-11-04 RX ORDER — ALBUTEROL SULFATE 2.5 MG/3ML
2.5 SOLUTION RESPIRATORY (INHALATION) ONCE
Status: COMPLETED | OUTPATIENT
Start: 2022-11-04 | End: 2022-11-04

## 2022-11-04 RX ADMIN — ASPIRIN 81 MG 324 MG: 81 TABLET ORAL at 22:12

## 2022-11-04 RX ADMIN — ONDANSETRON 4 MG: 2 INJECTION INTRAMUSCULAR; INTRAVENOUS at 22:12

## 2022-11-04 RX ADMIN — ALBUTEROL SULFATE 2.5 MG: 2.5 SOLUTION RESPIRATORY (INHALATION) at 22:20

## 2022-11-04 RX ADMIN — PANTOPRAZOLE SODIUM 40 MG: 40 INJECTION, POWDER, FOR SOLUTION INTRAVENOUS at 22:12

## 2022-11-05 LAB
QT INTERVAL: 385 MS
QT INTERVAL: 390 MS
SARS-COV-2 RNA PNL SPEC NAA+PROBE: NOT DETECTED

## 2022-11-05 RX ORDER — PREDNISONE 50 MG/1
50 TABLET ORAL DAILY
Qty: 5 TABLET | Refills: 0 | Status: SHIPPED | OUTPATIENT
Start: 2022-11-05 | End: 2022-11-10

## 2022-11-05 RX ORDER — AMOXICILLIN AND CLAVULANATE POTASSIUM 875; 125 MG/1; MG/1
1 TABLET, FILM COATED ORAL 2 TIMES DAILY
Qty: 20 TABLET | Refills: 0 | Status: SHIPPED | OUTPATIENT
Start: 2022-11-04 | End: 2022-11-05 | Stop reason: SDUPTHER

## 2022-11-05 RX ORDER — BENZONATATE 100 MG/1
200 CAPSULE ORAL 3 TIMES DAILY PRN
Qty: 30 CAPSULE | Refills: 0 | Status: SHIPPED | OUTPATIENT
Start: 2022-11-05 | End: 2022-11-15

## 2022-11-05 RX ORDER — AMOXICILLIN AND CLAVULANATE POTASSIUM 875; 125 MG/1; MG/1
1 TABLET, FILM COATED ORAL 2 TIMES DAILY
Qty: 20 TABLET | Refills: 0 | Status: SHIPPED | OUTPATIENT
Start: 2022-11-05 | End: 2022-11-15

## 2022-11-05 RX ORDER — BENZONATATE 100 MG/1
200 CAPSULE ORAL 3 TIMES DAILY PRN
Qty: 30 CAPSULE | Refills: 0 | Status: SHIPPED | OUTPATIENT
Start: 2022-11-05 | End: 2022-11-05 | Stop reason: SDUPTHER

## 2022-11-05 RX ORDER — PREDNISONE 50 MG/1
50 TABLET ORAL DAILY
Qty: 5 TABLET | Refills: 0 | Status: SHIPPED | OUTPATIENT
Start: 2022-11-04 | End: 2022-11-05 | Stop reason: SDUPTHER

## 2022-11-05 NOTE — ED PROVIDER NOTES
Time: 9:22 PM EDT  Arrived by: private car  Chief Complaint: Chest pain  History provided by: elena  History is limited by: N/A     History of Present Illness:  Patient is a 57 y.o. year old female that presents to the emergency department with chest pain. Pt reports chest pain and throat pain started this morning and has been constant unchanged. Pt reports chest pain radiates to throat. Pt reports the pain is tightness and hurts her throat when she breathes.  The patient also notes that its painful to swallow.  Pt reports chronic SOB. Pt reports SOB is worse than normal. Pt denies fever, myalgias, shake, and chills. Pt reports chronic cough unchanged. Pt reports nausea, denies emesis, diaphoresis. Pt denies hx of reflex disease. Pt denies unilateral leg pain or swelling. Pt denies hx of DVT or pulmonary embolism. Pt does not have hx of cancer. Pt denies recent long travels or imobilization. Pt has not had surgery in the last 6 weeks. Pt denies hx of HTN, high cholesterol, diabetes. Pt reports history of coronary disease. Pt reports smoking. Pt reports getting flu shot 3 weeks ago. Pt does have a family history of coronary diseases.      HPI    Similar Symptoms Previously: no  Recently seen: no      Patient Care Team  Primary Care Provider: Zuleima Thakur APRN    Past Medical History:     No Known Allergies  Past Medical History:   Diagnosis Date   • COPD (chronic obstructive pulmonary disease) (HCC)    • Hyperlipidemia    • Radius fracture     RIGHT RADIUS- CURRENTLY     Past Surgical History:   Procedure Laterality Date   • MOUTH SURGERY      DENTAL IMPLANTS   • ORIF WRIST FRACTURE Right 6/30/2022    Procedure: OPEN REDUCTION INTERNAL FIXATION OF RIGHT RADIUS;  Surgeon: Erasto Schroeder MD;  Location: [facility];  Service: Orthopedics;  Laterality: Right;     History reviewed. No pertinent family history.    Home Medications:  Prior to Admission medications    Medication Sig Start Date End Date Taking?  Authorizing Provider   albuterol (PROAIR RESPICLICK) 108 (90 Base) MCG/ACT inhaler Inhale 2 puffs Every 4 (Four) Hours As Needed for Wheezing or Shortness of Air.    Mila Gillespie MD   Biotin 2500 MCG capsule Take  by mouth.    Mila Gillespie MD   Black Elderberry,Berry-Flower, 575 MG capsule Take 1 tablet by mouth Daily.    Mila Gillespie MD   Krill Oil Ultra Strength 1500 MG capsule Take 1 tablet by mouth Daily.    Mila Gillespie MD   vitamin C (ASCORBIC ACID) 250 MG tablet Take 250 mg by mouth Daily.    Mila Gillespie MD        Social History:   Social History     Tobacco Use   • Smoking status: Every Day     Packs/day: 0.50     Types: Cigarettes   • Smokeless tobacco: Never   • Tobacco comments:     INSTRUCTED NO SMOKING 24 HR PRIOR TO SURGERY   Vaping Use   • Vaping Use: Every day   • Substances: Nicotine   • Devices: Pre-filled pod, INSTRUCTED NO VAPING 24 HR PRIOR TO SURGERY   Substance Use Topics   • Alcohol use: Yes     Comment: OCC.   • Drug use: Never         Review of Systems:  Review of Systems   Constitutional: Negative for chills, diaphoresis and fever.   HENT: Negative for congestion, postnasal drip, rhinorrhea and sore throat.    Eyes: Negative for photophobia.   Respiratory: Positive for shortness of breath. Negative for cough and chest tightness.    Cardiovascular: Positive for chest pain. Negative for palpitations and leg swelling.   Gastrointestinal: Positive for nausea. Negative for abdominal pain, diarrhea and vomiting.   Genitourinary: Negative for difficulty urinating, dysuria, flank pain, frequency, hematuria and urgency.   Musculoskeletal: Negative for neck pain and neck stiffness.   Skin: Negative for pallor and rash.   Neurological: Negative for dizziness, syncope, weakness, numbness and headaches.   Hematological: Negative for adenopathy. Does not bruise/bleed easily.   Psychiatric/Behavioral: Negative.         Physical Exam:  /82   Pulse 103   " Temp 98.2 °F (36.8 °C) (Oral)   Resp 18   Ht 165.1 cm (65\")   Wt 68.9 kg (152 lb)   SpO2 92%   BMI 25.29 kg/m²     Physical Exam  Vitals and nursing note reviewed.   Constitutional:       General: She is not in acute distress.     Appearance: Normal appearance. She is not ill-appearing, toxic-appearing or diaphoretic.   HENT:      Head: Normocephalic and atraumatic.      Mouth/Throat:      Mouth: Mucous membranes are moist.      Pharynx: Posterior oropharyngeal erythema (mild) present.      Comments: No white plaques or yeasts  Eyes:      Pupils: Pupils are equal, round, and reactive to light.   Neck:      Thyroid: No thyroid mass.      Comments: No expletory stridor osculated at the anterior neck.    No obvious neck masses  Cardiovascular:      Rate and Rhythm: Normal rate and regular rhythm.      Pulses: Normal pulses.           Carotid pulses are 2+ on the right side and 2+ on the left side.       Radial pulses are 2+ on the right side and 2+ on the left side.        Femoral pulses are 2+ on the right side and 2+ on the left side.       Popliteal pulses are 2+ on the right side and 2+ on the left side.        Dorsalis pedis pulses are 2+ on the right side and 2+ on the left side.        Posterior tibial pulses are 2+ on the right side and 2+ on the left side.      Heart sounds: Normal heart sounds. No murmur heard.  Pulmonary:      Effort: Pulmonary effort is normal. No accessory muscle usage, respiratory distress or retractions.      Breath sounds: Examination of the right-upper field reveals decreased breath sounds. Examination of the left-upper field reveals decreased breath sounds. Examination of the right-middle field reveals decreased breath sounds. Examination of the left-middle field reveals decreased breath sounds. Examination of the right-lower field reveals decreased breath sounds. Examination of the left-lower field reveals decreased breath sounds. Decreased breath sounds present. No wheezing, " rhonchi or rales.   Abdominal:      General: Abdomen is flat. There is no distension.      Palpations: Abdomen is soft. There is no mass.      Tenderness: There is no abdominal tenderness. There is no right CVA tenderness, left CVA tenderness, guarding or rebound.      Comments: No rigidity   Musculoskeletal:         General: No swelling, tenderness or deformity.      Cervical back: Neck supple. No tenderness.      Right lower leg: No tenderness. No edema.      Left lower leg: No tenderness. No edema.   Lymphadenopathy:      Cervical: No cervical adenopathy.   Skin:     General: Skin is warm and dry.      Capillary Refill: Capillary refill takes less than 2 seconds.      Coloration: Skin is not jaundiced or pale.      Findings: No erythema.   Neurological:      General: No focal deficit present.      Mental Status: She is alert and oriented to person, place, and time. Mental status is at baseline.      Sensory: No sensory deficit.      Motor: No weakness.   Psychiatric:         Mood and Affect: Mood normal.         Behavior: Behavior normal.                Medications in the Emergency Department:  Medications   aspirin chewable tablet 324 mg (324 mg Oral Given 11/4/22 2212)   ondansetron (ZOFRAN) injection 4 mg (4 mg Intravenous Given 11/4/22 2212)   albuterol (PROVENTIL) nebulizer solution 0.083% 2.5 mg/3mL (2.5 mg Nebulization Given 11/4/22 2220)   pantoprazole (PROTONIX) injection 40 mg (40 mg Intravenous Given 11/4/22 2212)        Labs  Lab Results (last 24 hours)     ** No results found for the last 24 hours. **           Imaging:  No Radiology Exams Resulted Within Past 24 Hours    Procedures:  Procedures    Progress  ED Course as of 11/08/22 0539   Fri Nov 04, 2022   1900 EKG:    Rhythm: Sinus rhythm  Rate: 78  Intervals: Normal WA and QT interval  T-wave: Nonspecific T wave flattening  ST Segment: No pathological ST elevation or ST depression to suggest myocardial injury myocardial ischemia    EKG  Comparison: The QRS and ST morphology are unchanged from the EKG performed June 30, 2022    Interpreted by me   [SD]   2115 EKG:    Rhythm: Sinus rhythm  Rate: 77  RSR prime in V1 V2 which is a normal variant  Intervals: Normal ND and QT interval  T-wave: Isolated nonspecific T wave inversion in V2  ST Segment: No pathological ST elevation or ST depression to suggest myocardial injury myocardial ischemia    EKG Comparison: Overall there is no change in the QRS and ST morphology from EKG performed earlier in the department    Interpreted by me   [SD]      ED Course User Index  [SD] Ildefonso Mendes DO                            Medical Decision Making:  MDM  Number of Diagnoses or Management Options  Acute bronchitis, unspecified organism  Chest discomfort  Chronic obstructive pulmonary disease, unspecified COPD type (HCC)  Pharyngitis, unspecified etiology  Diagnosis management comments:     Patient had a negative flu.  The patient's chemistry was rather unremarkable.  The patient's CBC demonstrated a slightly elevated white blood cell count at 10.9.  The patient's hemoglobin and hematocrit were elevated with a hemoglobin of 16.3 and hematocrit 48.0.  Otherwise the CBC was rather unremarkable.  The patient had a normal lipase.  The patient had a normal BNP.  The patient had a normal magnesium.  The patient's flu was negative.  The patient strep was negative.  Patient's chest x-ray was clear.  Patient was given albuterol Zofran Protonix which relieved the patient's symptoms.    The patient had 2 troponins that were within normal limits.  The patient had 2 EKGs that demonstrated no evidence of ST segment elevation MI or other injury pattern        HEART Score for Major Cardiac Events - MDCalc  Calculated on Nov 04 2022 11:48 PM  2 points -> Low Score (0-3 points) Risk of MACE of 0.9-1.7%.    The patient appears well, in no distress and nontoxic.  The patient is resting comfortably.  The patient has a low heart score.  I  have discussed the significance of the heart score with them.  The patient understands that they have a low risk for cardiovascular event over the next 6 weeks.  Understanding the risks, they feel comfortable to be discharged home and to follow-up with the cardiologist on an outpatient basis for stress test.   In the interim, the patient does understand should they develop worsening chest pain, near syncope, syncope, extreme fatigue, worsening shortness of breath or diaphoresis to return back to emergency room immediately.      The patient's bronchitis and COPD exacerbation will be treated with Augmentin, prednisone and Tessalon.  The patient will follow-up with her primary care physician in regards to the COPD exacerbation and bronchitis.       Amount and/or Complexity of Data Reviewed  Clinical lab tests: reviewed  Tests in the radiology section of CPT®: reviewed  Tests in the medicine section of CPT®: reviewed                 Final diagnoses:   Chest discomfort   Acute bronchitis, unspecified organism   Chronic obstructive pulmonary disease, unspecified COPD type (HCC)   Pharyngitis, unspecified etiology        Disposition:  ED Disposition     ED Disposition   Discharge    Condition   Stable    Comment   --             Documentation assistance provided by Jeaneth Campos acting as scribe for Ildefonso Mendes DO. Information recorded by the scribe was done at my direction and has been verified and validated by me.          Jeaneth Campos  11/04/22 9768       Ildefonso Mendes DO  11/08/22 4517

## 2022-11-07 LAB — BACTERIA SPEC AEROBE CULT: NORMAL

## (undated) DEVICE — INTENDED FOR TISSUE SEPARATION, AND OTHER PROCEDURES THAT REQUIRE A SHARP SURGICAL BLADE TO PUNCTURE OR CUT.: Brand: BARD-PARKER ® CARBON RIB-BACK BLADES

## (undated) DEVICE — BNDG ESMARK 4IN 12FT LF STRL BLU

## (undated) DEVICE — GOWN,REINF,POLY,SIRUS,BRTH SLV,XLNG/XXL: Brand: MEDLINE

## (undated) DEVICE — PAD GRND REM POLYHESIVE A/ DISP

## (undated) DEVICE — 3M™ STERI-DRAPE™ X-RAY IMAGE INTENSIFIER DRAPE, 10 PER CARTON / 4 CARTONS PER CASE, 1013: Brand: STERI-DRAPE™

## (undated) DEVICE — SCREWDRIVER BLADE T8 AO, SELF RETAINING: Brand: VARIAX

## (undated) DEVICE — APPL CHLORAPREP 26ML CLR

## (undated) DEVICE — SLV SCD KN/LEN ADJ EXPRSS BLENDED MD 1P/U

## (undated) DEVICE — DRILL BIT, AO, SCALED: Brand: VARIAX

## (undated) DEVICE — KIRSCHNER WIRE
Type: IMPLANTABLE DEVICE | Site: WRIST | Status: NON-FUNCTIONAL
Brand: VARIAX
Removed: 2022-06-30

## (undated) DEVICE — SUT ETHLN 3-0 FS118IN 663H

## (undated) DEVICE — COMFORT ARM SLING: Brand: DEROYAL

## (undated) DEVICE — EXTREMITY-LF: Brand: MEDLINE INDUSTRIES, INC.

## (undated) DEVICE — SUT VIC 3/0 SH 27IN J416H

## (undated) DEVICE — GLV SURG SENSICARE SLT PF LF 6 STRL

## (undated) DEVICE — STERILE POLYISOPRENE POWDER-FREE SURGICAL GLOVES WITH EMOLLIENT COATING: Brand: PROTEXIS

## (undated) DEVICE — BNDG ELAS DELUXE REINF 10CM 5MTR STRL

## (undated) DEVICE — DISPOSABLE TOURNIQUET CUFF SINGLE BLADDER, SINGLE PORT AND QUICK CONNECT CONNECTOR: Brand: COLOR CUFF

## (undated) DEVICE — GLV SURG SENSICARE PI ORTHO SZ8 LF STRL

## (undated) DEVICE — DRSNG GZ PETROLTM XEROFORM CURAD 1X8IN STRL

## (undated) DEVICE — TRAY,IRRIGATION,BULBSYRINGE,60ML,CSR,PVP: Brand: MEDLINE

## (undated) DEVICE — GAUZE,SPONGE,4"X4",16PLY,STRL,LF,10/TRAY: Brand: MEDLINE

## (undated) DEVICE — PENCL E/S SMOKEEVAC W/TELESCP CANN

## (undated) DEVICE — STERILE POLYISOPRENE POWDER-FREE SURGICAL GLOVES: Brand: PROTEXIS

## (undated) DEVICE — UNDERCAST PADDING: Brand: DEROYAL

## (undated) DEVICE — LOCKING SCREW, FULLY THREADED,T8
Type: IMPLANTABLE DEVICE | Site: WRIST | Status: NON-FUNCTIONAL
Brand: VARIAX
Removed: 2022-06-30